# Patient Record
Sex: FEMALE | URBAN - METROPOLITAN AREA
[De-identification: names, ages, dates, MRNs, and addresses within clinical notes are randomized per-mention and may not be internally consistent; named-entity substitution may affect disease eponyms.]

---

## 2017-02-23 ENCOUNTER — EMERGENCY (EMERGENCY)
Facility: HOSPITAL | Age: 27
LOS: 1 days | Discharge: HOME | End: 2017-02-23
Attending: EMERGENCY MEDICINE | Admitting: EMERGENCY MEDICINE
Payer: COMMERCIAL

## 2017-02-23 VITALS
HEART RATE: 78 BPM | OXYGEN SATURATION: 99 % | TEMPERATURE: 99 F | DIASTOLIC BLOOD PRESSURE: 56 MMHG | RESPIRATION RATE: 16 BRPM | SYSTOLIC BLOOD PRESSURE: 115 MMHG

## 2017-02-23 VITALS
TEMPERATURE: 98 F | RESPIRATION RATE: 16 BRPM | DIASTOLIC BLOOD PRESSURE: 60 MMHG | SYSTOLIC BLOOD PRESSURE: 108 MMHG | OXYGEN SATURATION: 100 % | HEART RATE: 75 BPM

## 2017-02-23 DIAGNOSIS — R10.9 UNSPECIFIED ABDOMINAL PAIN: ICD-10-CM

## 2017-02-23 DIAGNOSIS — Z90.49 ACQUIRED ABSENCE OF OTHER SPECIFIED PARTS OF DIGESTIVE TRACT: Chronic | ICD-10-CM

## 2017-02-23 DIAGNOSIS — Z79.2 LONG TERM (CURRENT) USE OF ANTIBIOTICS: ICD-10-CM

## 2017-02-23 DIAGNOSIS — Z79.899 OTHER LONG TERM (CURRENT) DRUG THERAPY: ICD-10-CM

## 2017-02-23 DIAGNOSIS — R16.0 HEPATOMEGALY, NOT ELSEWHERE CLASSIFIED: ICD-10-CM

## 2017-02-23 DIAGNOSIS — M54.9 DORSALGIA, UNSPECIFIED: ICD-10-CM

## 2017-02-23 LAB
ALBUMIN SERPL ELPH-MCNC: 4.5 G/DL — SIGNIFICANT CHANGE UP (ref 3.4–5)
ALP SERPL-CCNC: 43 U/L — SIGNIFICANT CHANGE UP (ref 40–120)
ALT FLD-CCNC: 13 U/L — SIGNIFICANT CHANGE UP (ref 12–42)
ANION GAP SERPL CALC-SCNC: 8 MMOL/L — LOW (ref 9–16)
ANISOCYTOSIS BLD QL: SLIGHT — SIGNIFICANT CHANGE UP
APPEARANCE UR: CLEAR — SIGNIFICANT CHANGE UP
AST SERPL-CCNC: 18 U/L — SIGNIFICANT CHANGE UP (ref 15–37)
BILIRUB SERPL-MCNC: 0.6 MG/DL — SIGNIFICANT CHANGE UP (ref 0.2–1.2)
BILIRUB UR-MCNC: NEGATIVE — SIGNIFICANT CHANGE UP
BUN SERPL-MCNC: 9 MG/DL — SIGNIFICANT CHANGE UP (ref 7–23)
CALCIUM SERPL-MCNC: 9.2 MG/DL — SIGNIFICANT CHANGE UP (ref 8.5–10.5)
CHLORIDE SERPL-SCNC: 102 MMOL/L — SIGNIFICANT CHANGE UP (ref 96–108)
CO2 SERPL-SCNC: 27 MMOL/L — SIGNIFICANT CHANGE UP (ref 22–31)
COLOR SPEC: YELLOW — SIGNIFICANT CHANGE UP
CREAT SERPL-MCNC: 0.55 MG/DL — SIGNIFICANT CHANGE UP (ref 0.5–1.3)
DIFF PNL FLD: NEGATIVE — SIGNIFICANT CHANGE UP
GLUCOSE SERPL-MCNC: 91 MG/DL — SIGNIFICANT CHANGE UP (ref 70–99)
GLUCOSE UR QL: NEGATIVE — SIGNIFICANT CHANGE UP
HCG UR QL: NEGATIVE — SIGNIFICANT CHANGE UP
HCT VFR BLD CALC: 35.2 % — SIGNIFICANT CHANGE UP (ref 34.5–45)
HGB BLD-MCNC: 11 G/DL — LOW (ref 11.5–15.5)
HYPOCHROMIA BLD QL: SIGNIFICANT CHANGE UP
KETONES UR-MCNC: NEGATIVE — SIGNIFICANT CHANGE UP
LACTATE SERPL-SCNC: 0.5 MMOL/L — SIGNIFICANT CHANGE UP (ref 0.4–2)
LEUKOCYTE ESTERASE UR-ACNC: NEGATIVE — SIGNIFICANT CHANGE UP
MACROCYTES OVAL BLD QL SMEAR: SLIGHT — SIGNIFICANT CHANGE UP
MCHC RBC-ENTMCNC: 19 PG — LOW (ref 27–34)
MCHC RBC-ENTMCNC: 31.3 G/DL — LOW (ref 32–36)
MCV RBC AUTO: 60.8 FL — LOW (ref 80–100)
MICROCYTES BLD QL: SIGNIFICANT CHANGE UP
NITRITE UR-MCNC: NEGATIVE — SIGNIFICANT CHANGE UP
OVALOCYTES BLD QL SMEAR: SLIGHT — SIGNIFICANT CHANGE UP
PH UR: 6 — SIGNIFICANT CHANGE UP (ref 4–8.1)
PLAT MORPH BLD: NORMAL — SIGNIFICANT CHANGE UP
PLATELET # BLD AUTO: 287 K/UL — SIGNIFICANT CHANGE UP (ref 150–400)
POIKILOCYTOSIS BLD QL AUTO: SLIGHT — SIGNIFICANT CHANGE UP
POTASSIUM SERPL-MCNC: 3.8 MMOL/L — SIGNIFICANT CHANGE UP (ref 3.5–5.3)
POTASSIUM SERPL-SCNC: 3.8 MMOL/L — SIGNIFICANT CHANGE UP (ref 3.5–5.3)
PROT SERPL-MCNC: 7.9 G/DL — SIGNIFICANT CHANGE UP (ref 6.4–8.2)
PROT UR-MCNC: NEGATIVE MG/DL — SIGNIFICANT CHANGE UP
RBC # BLD: 5.79 M/UL — HIGH (ref 3.8–5.2)
RBC # FLD: 15.6 % — SIGNIFICANT CHANGE UP (ref 10.3–16.9)
RBC BLD AUTO: NORMAL — SIGNIFICANT CHANGE UP
SODIUM SERPL-SCNC: 137 MMOL/L — SIGNIFICANT CHANGE UP (ref 132–145)
SP GR SPEC: 1.02 — SIGNIFICANT CHANGE UP (ref 1–1.03)
UROBILINOGEN FLD QL: 0.2 E.U./DL — SIGNIFICANT CHANGE UP
WBC # BLD: 8.8 K/UL — SIGNIFICANT CHANGE UP (ref 3.8–10.5)
WBC # FLD AUTO: 8.8 K/UL — SIGNIFICANT CHANGE UP (ref 3.8–10.5)

## 2017-02-23 PROCEDURE — 99285 EMERGENCY DEPT VISIT HI MDM: CPT

## 2017-02-23 PROCEDURE — 74176 CT ABD & PELVIS W/O CONTRAST: CPT | Mod: 26

## 2017-02-23 RX ORDER — KETOROLAC TROMETHAMINE 30 MG/ML
30 SYRINGE (ML) INJECTION ONCE
Refills: 0 | Status: DISCONTINUED | OUTPATIENT
Start: 2017-02-23 | End: 2017-02-23

## 2017-02-23 RX ORDER — SODIUM CHLORIDE 9 MG/ML
3 INJECTION INTRAMUSCULAR; INTRAVENOUS; SUBCUTANEOUS ONCE
Refills: 0 | Status: COMPLETED | OUTPATIENT
Start: 2017-02-23 | End: 2017-02-23

## 2017-02-23 RX ORDER — SODIUM CHLORIDE 9 MG/ML
1000 INJECTION INTRAMUSCULAR; INTRAVENOUS; SUBCUTANEOUS ONCE
Refills: 0 | Status: COMPLETED | OUTPATIENT
Start: 2017-02-23 | End: 2017-02-23

## 2017-02-23 RX ADMIN — SODIUM CHLORIDE 3 MILLILITER(S): 9 INJECTION INTRAMUSCULAR; INTRAVENOUS; SUBCUTANEOUS at 20:26

## 2017-02-23 RX ADMIN — Medication 30 MILLIGRAM(S): at 20:45

## 2017-02-23 RX ADMIN — Medication 30 MILLIGRAM(S): at 23:16

## 2017-02-23 RX ADMIN — SODIUM CHLORIDE 1000 MILLILITER(S): 9 INJECTION INTRAMUSCULAR; INTRAVENOUS; SUBCUTANEOUS at 20:27

## 2017-02-23 NOTE — ED ADULT NURSE NOTE - OBJECTIVE STATEMENT
Pt. with hx. of kidney infection c/o left flank pain and pain on urination. Pt. denies any hematuria or abdominal pain. Reports pain is similar to her previous infection.

## 2017-02-23 NOTE — ED PROVIDER NOTE - MEDICAL DECISION MAKING DETAILS
27 y/o F with PMH of pyelonephritis and UTI on Macrobid PRN per Urologist  presents to ED c/o L flank pain, worse with movement and urinary frequency.   Pt well appearing. Afebrile.  VSS.  CT negative 25 y/o F with PMH of pyelonephritis and UTI on Macrobid PRN per Urologist  presents to ED c/o L flank pain, worse with movement and urinary frequency.   Pt well appearing. Afebrile.  VSS.  CT negative.  Incidental finding of hepatomegaly discussed.  Pt advised to f/u with primary care regarding results. 27 y/o F with PMH of pyelonephritis and UTI on Macrobid PRN per Urologist  presents to ED c/o L flank pain, worse with movement and urinary frequency.   Pt well appearing. Afebrile.  VSS.  CT negative.  Incidental finding of hepatomegaly discussed.  Pain is likely MSK.  Pt advised to f/u with primary care regarding results.

## 2017-02-23 NOTE — ED PROVIDER NOTE - CARE PLAN
Principal Discharge DX:	Muscular pain Principal Discharge DX:	Muscular pain  Secondary Diagnosis:	Hepatomegaly

## 2017-02-23 NOTE — ED PROVIDER NOTE - OBJECTIVE STATEMENT
27 y/o F with PMH of pyelonephritis and frequent UTIs presents to ED c/o L flank pain and urinary frequency.  Her symptoms started 3 days ago with urinary frequency.  She took Macrobid x 1 day as prescribed by her urologist prn.  Her symptoms resolved temporarily.  Today, they returned.  She took once dose of Macrobid this am and went to [facility].  She was sent for an ultrasound and was told by the technician that the study was effectively negative. 25 y/o F with PMH of pyelonephritis and frequent UTIs presents to ED c/o L flank pain and urinary frequency.  Her symptoms started 3 days ago with urinary frequency.  She took Macrobid x 1 day as prescribed by her urologist prn.  Her symptoms resolved temporarily.  Today, they returned.  She took once dose of Macrobid this am and went to [facility].  She was sent for an ultrasound and was told by the technician that the study was effectively negative.  The flank pain is reproducible with deep inspiration, laughter and movement but is very concerned that she still has pyelonephritis as she ended up having positive blood culture with her last infection.

## 2017-02-24 ENCOUNTER — LABORATORY RESULT (OUTPATIENT)
Age: 27
End: 2017-02-24

## 2017-02-24 ENCOUNTER — APPOINTMENT (OUTPATIENT)
Dept: INTERNAL MEDICINE | Facility: CLINIC | Age: 27
End: 2017-02-24

## 2017-02-24 VITALS
OXYGEN SATURATION: 97 % | SYSTOLIC BLOOD PRESSURE: 116 MMHG | TEMPERATURE: 98.8 F | HEART RATE: 76 BPM | DIASTOLIC BLOOD PRESSURE: 64 MMHG | HEIGHT: 65 IN

## 2017-02-24 DIAGNOSIS — L20.82 FLEXURAL ECZEMA: ICD-10-CM

## 2017-02-24 DIAGNOSIS — D56.3 THALASSEMIA MINOR: ICD-10-CM

## 2017-02-24 DIAGNOSIS — R16.0 HEPATOMEGALY, NOT ELSEWHERE CLASSIFIED: ICD-10-CM

## 2017-02-24 DIAGNOSIS — F41.9 ANXIETY DISORDER, UNSPECIFIED: ICD-10-CM

## 2017-02-24 DIAGNOSIS — Z83.2 FAMILY HISTORY OF DISEASES OF THE BLOOD AND BLOOD-FORMING ORGANS AND CERTAIN DISORDERS INVOLVING THE IMMUNE MECHANISM: ICD-10-CM

## 2017-02-24 DIAGNOSIS — R10.9 UNSPECIFIED ABDOMINAL PAIN: ICD-10-CM

## 2017-02-24 DIAGNOSIS — R35.0 FREQUENCY OF MICTURITION: ICD-10-CM

## 2017-02-24 DIAGNOSIS — M26.609 UNSPECIFIED TEMPOROMANDIBULAR JOINT DISORDER: ICD-10-CM

## 2017-02-24 RX ORDER — METOCLOPRAMIDE 10 MG/1
10 TABLET ORAL
Qty: 30 | Refills: 0 | Status: COMPLETED | COMMUNITY
Start: 2016-11-10

## 2017-02-24 RX ORDER — OXYCODONE 5 MG/1
5 TABLET ORAL 3 TIMES DAILY
Qty: 15 | Refills: 0 | Status: ACTIVE | COMMUNITY
Start: 2017-02-24 | End: 1900-01-01

## 2017-02-24 RX ORDER — ALPRAZOLAM 1 MG/1
1 TABLET ORAL AT BEDTIME
Refills: 0 | Status: ACTIVE | COMMUNITY
Start: 2016-09-29

## 2017-02-24 RX ORDER — LISDEXAMFETAMINE DIMESYLATE 20 MG/1
20 CAPSULE ORAL
Qty: 30 | Refills: 0 | Status: COMPLETED | COMMUNITY
Start: 2016-11-10

## 2017-02-24 RX ORDER — NITROFURANTOIN (MONOHYDRATE/MACROCRYSTALS) 25; 75 MG/1; MG/1
100 CAPSULE ORAL
Qty: 28 | Refills: 0 | Status: COMPLETED | COMMUNITY
Start: 2017-01-25

## 2017-02-24 RX ORDER — ONDANSETRON 4 MG/1
4 TABLET ORAL
Qty: 21 | Refills: 0 | Status: COMPLETED | COMMUNITY
Start: 2016-11-08

## 2017-02-24 RX ORDER — FAMOTIDINE 20 MG/1
20 TABLET, FILM COATED ORAL
Qty: 20 | Refills: 0 | Status: COMPLETED | COMMUNITY
Start: 2016-11-08

## 2017-02-24 RX ORDER — SULFAMETHOXAZOLE AND TRIMETHOPRIM 800; 160 MG/1; MG/1
800-160 TABLET ORAL
Qty: 14 | Refills: 0 | Status: COMPLETED | COMMUNITY
Start: 2017-01-05

## 2017-02-24 RX ORDER — HYDROCORTISONE 10 MG/G
1 OINTMENT TOPICAL TWICE DAILY
Qty: 1 | Refills: 1 | Status: ACTIVE | COMMUNITY
Start: 2017-02-24 | End: 1900-01-01

## 2017-02-24 RX ORDER — DEXMETHYLPHENIDATE HYDROCHLORIDE 10 MG/1
10 CAPSULE, EXTENDED RELEASE ORAL
Qty: 30 | Refills: 0 | Status: COMPLETED | COMMUNITY
Start: 2017-01-29

## 2017-02-24 RX ORDER — OXYCODONE AND ACETAMINOPHEN 5; 325 MG/1; MG/1
5-325 TABLET ORAL
Qty: 12 | Refills: 0 | Status: COMPLETED | COMMUNITY
Start: 2016-11-08

## 2017-02-24 RX ORDER — LEVOFLOXACIN 750 MG/1
750 TABLET, FILM COATED ORAL
Qty: 5 | Refills: 0 | Status: COMPLETED | COMMUNITY
Start: 2016-11-08

## 2017-02-24 RX ORDER — FLUOXETINE HYDROCHLORIDE 10 MG/1
10 CAPSULE ORAL
Qty: 60 | Refills: 0 | Status: ACTIVE | COMMUNITY
Start: 2017-02-08

## 2017-02-24 RX ORDER — IBUPROFEN 800 MG/1
800 TABLET, FILM COATED ORAL
Qty: 21 | Refills: 0 | Status: ACTIVE | COMMUNITY
Start: 2016-11-08

## 2017-02-24 RX ORDER — DEXMETHYLPHENIDATE HYDROCHLORIDE 5 MG/1
5 CAPSULE, EXTENDED RELEASE ORAL
Qty: 30 | Refills: 0 | Status: ACTIVE | COMMUNITY
Start: 2017-01-08

## 2017-02-24 RX ORDER — FLUOXETINE HYDROCHLORIDE 20 MG/1
20 CAPSULE ORAL
Qty: 30 | Refills: 0 | Status: ACTIVE | COMMUNITY
Start: 2017-02-08

## 2017-02-25 LAB
CULTURE RESULTS: NO GROWTH — SIGNIFICANT CHANGE UP
SPECIMEN SOURCE: SIGNIFICANT CHANGE UP

## 2017-03-03 PROBLEM — R35.0 URINARY FREQUENCY: Status: ACTIVE | Noted: 2017-02-24

## 2017-03-03 PROBLEM — R10.9 ACUTE RIGHT FLANK PAIN: Status: ACTIVE | Noted: 2017-02-24

## 2017-03-03 PROBLEM — L20.82 FLEXURAL ECZEMA: Status: ACTIVE | Noted: 2017-02-24

## 2017-03-03 LAB
A1AT SERPL-MCNC: 128 MG/DL
ALBUMIN SERPL ELPH-MCNC: 4.6 G/DL
ALP BLD-CCNC: 42 U/L
ALT SERPL-CCNC: 16 U/L
ANA SER IF-ACNC: NEGATIVE
APPEARANCE: CLEAR
APTT BLD: 31.8 SEC
AST SERPL-CCNC: 15 U/L
BILIRUB DIRECT SERPL-MCNC: 0.2 MG/DL
BILIRUB INDIRECT SERPL-MCNC: 0.4 MG/DL
BILIRUB SERPL-MCNC: 0.6 MG/DL
BILIRUBIN URINE: NEGATIVE
BLOOD URINE: NEGATIVE
CHOLEST SERPL-MCNC: 193 MG/DL
CHOLEST/HDLC SERPL: 1.9 RATIO
CMV IGG SERPL QL: <0.2 U/ML
CMV IGG SERPL-IMP: NEGATIVE
CMV IGM SERPL QL: <8 AU/ML
CMV IGM SERPL QL: NEGATIVE
COLOR: YELLOW
GGT SERPL-CCNC: 16 U/L
GLUCOSE QUALITATIVE U: NORMAL MG/DL
HAV IGG+IGM SER QL: NONREACTIVE
HAV IGM SER QL: NONREACTIVE
HBA1C MFR BLD HPLC: 5.1 %
HBV CORE IGG+IGM SER QL: NONREACTIVE
HBV SURFACE AB SER QL: REACTIVE
HBV SURFACE AG SER QL: NONREACTIVE
HCV AB SER QL: NONREACTIVE
HCV S/CO RATIO: 0.31 S/CO
HDLC SERPL-MCNC: 101 MG/DL
HETEROPH AB SER QL: NEGATIVE
HIV1+2 AB SPEC QL IA.RAPID: NONREACTIVE
INR PPP: 0.93 RATIO
KETONES URINE: NEGATIVE
LDLC SERPL CALC-MCNC: 78 MG/DL
LEUKOCYTE ESTERASE URINE: NEGATIVE
MITOCHONDRIA AB SER IF-ACNC: NORMAL
MPO AB + PR3 PNL SER: NORMAL
NITRITE URINE: NEGATIVE
PH URINE: 6.5
PROT SERPL-MCNC: 7.2 G/DL
PROTEIN URINE: NEGATIVE MG/DL
PT BLD: 10.5 SEC
SMOOTH MUSCLE AB SER QL IF: NORMAL
SOLUBLE LIVER IGG SER IA-ACNC: < 20.1 UNITS
SPECIFIC GRAVITY URINE: 1.01
THYROGLOB AB SERPL-ACNC: <20 IU/ML
THYROPEROXIDASE AB SERPL IA-ACNC: <10 IU/ML
TRIGL SERPL-MCNC: 68 MG/DL
TSH SERPL-ACNC: 1.32 UIU/ML
UROBILINOGEN URINE: NORMAL MG/DL

## 2018-07-27 PROBLEM — R16.0 HEPATOMEGALY: Status: ACTIVE | Noted: 2017-02-24

## 2024-04-26 RX ORDER — DEXMETHYLPHENIDATE HYDROCHLORIDE 35 MG/1
0 CAPSULE, EXTENDED RELEASE ORAL
Qty: 0 | Refills: 0 | DISCHARGE

## 2024-04-26 RX ORDER — NITROFURANTOIN MACROCRYSTAL 50 MG
1 CAPSULE ORAL
Qty: 0 | Refills: 0 | DISCHARGE

## 2024-12-16 ENCOUNTER — APPOINTMENT (OUTPATIENT)
Dept: ENDOCRINOLOGY | Facility: CLINIC | Age: 34
End: 2024-12-16